# Patient Record
Sex: FEMALE | Race: WHITE | NOT HISPANIC OR LATINO | Employment: FULL TIME | URBAN - METROPOLITAN AREA
[De-identification: names, ages, dates, MRNs, and addresses within clinical notes are randomized per-mention and may not be internally consistent; named-entity substitution may affect disease eponyms.]

---

## 2017-05-11 ENCOUNTER — HISTORICAL (OUTPATIENT)
Dept: ADMINISTRATIVE | Facility: HOSPITAL | Age: 37
End: 2017-05-11

## 2017-05-11 LAB
ABS NEUT (OLG): 3.7 X10(3)/MCL (ref 2.1–9.2)
ALBUMIN SERPL-MCNC: 4 GM/DL (ref 3.4–5)
ALBUMIN/GLOB SERPL: 1.2 RATIO (ref 1.1–2)
ALP SERPL-CCNC: 57 UNIT/L (ref 38–126)
ALT SERPL-CCNC: 12 UNIT/L (ref 12–78)
AMYLASE SERPL-CCNC: 45 UNIT/L (ref 25–115)
AST SERPL-CCNC: 12 UNIT/L (ref 15–37)
BASOPHILS # BLD AUTO: 0 X10(3)/MCL (ref 0–0.2)
BASOPHILS NFR BLD AUTO: 0 %
BILIRUB SERPL-MCNC: 0.4 MG/DL (ref 0.2–1)
BILIRUBIN DIRECT+TOT PNL SERPL-MCNC: 0.1 MG/DL (ref 0–0.5)
BILIRUBIN DIRECT+TOT PNL SERPL-MCNC: 0.3 MG/DL (ref 0–0.8)
BUN SERPL-MCNC: 11 MG/DL (ref 7–18)
CALCIUM SERPL-MCNC: 8.9 MG/DL (ref 8.5–10.1)
CHLORIDE SERPL-SCNC: 106 MMOL/L (ref 98–107)
CO2 SERPL-SCNC: 27 MMOL/L (ref 21–32)
CREAT SERPL-MCNC: 0.7 MG/DL (ref 0.55–1.02)
EOSINOPHIL # BLD AUTO: 0 X10(3)/MCL (ref 0–0.9)
EOSINOPHIL NFR BLD AUTO: 1 %
ERYTHROCYTE [DISTWIDTH] IN BLOOD BY AUTOMATED COUNT: 11.9 % (ref 11.5–17)
GLOBULIN SER-MCNC: 3.2 GM/DL (ref 2.4–3.5)
GLUCOSE SERPL-MCNC: 87 MG/DL (ref 74–106)
HCT VFR BLD AUTO: 41.5 % (ref 37–47)
HGB BLD-MCNC: 13.6 GM/DL (ref 12–16)
LIPASE SERPL-CCNC: 168 UNIT/L (ref 73–393)
LYMPHOCYTES # BLD AUTO: 1.2 X10(3)/MCL (ref 0.6–4.6)
LYMPHOCYTES NFR BLD AUTO: 23 %
MCH RBC QN AUTO: 30.2 PG (ref 27–31)
MCHC RBC AUTO-ENTMCNC: 32.8 GM/DL (ref 33–36)
MCV RBC AUTO: 92 FL (ref 80–94)
MONOCYTES # BLD AUTO: 0.4 X10(3)/MCL (ref 0.1–1.3)
MONOCYTES NFR BLD AUTO: 6 %
NEUTROPHILS # BLD AUTO: 3.7 X10(3)/MCL (ref 1.4–7.9)
NEUTROPHILS NFR BLD AUTO: 69 %
PLATELET # BLD AUTO: 273 X10(3)/MCL (ref 130–400)
PMV BLD AUTO: 10.7 FL (ref 9.4–12.4)
POTASSIUM SERPL-SCNC: 4.4 MMOL/L (ref 3.5–5.1)
PROT SERPL-MCNC: 7.2 GM/DL (ref 6.4–8.2)
RBC # BLD AUTO: 4.51 X10(6)/MCL (ref 4.2–5.4)
SODIUM SERPL-SCNC: 143 MMOL/L (ref 136–145)
WBC # SPEC AUTO: 5.4 X10(3)/MCL (ref 4.5–11.5)

## 2017-05-13 LAB — FINAL CULTURE: NORMAL

## 2018-09-13 ENCOUNTER — HISTORICAL (OUTPATIENT)
Dept: ADMINISTRATIVE | Facility: HOSPITAL | Age: 38
End: 2018-09-13

## 2018-09-13 LAB
ABS NEUT (OLG): 4.07 X10(3)/MCL (ref 2.1–9.2)
ALBUMIN SERPL-MCNC: 3.7 GM/DL (ref 3.4–5)
ALBUMIN/GLOB SERPL: 1.1 {RATIO}
ALP SERPL-CCNC: 69 UNIT/L (ref 38–126)
ALT SERPL-CCNC: 17 UNIT/L (ref 12–78)
AST SERPL-CCNC: 15 UNIT/L (ref 15–37)
BASOPHILS # BLD AUTO: 0 X10(3)/MCL (ref 0–0.2)
BASOPHILS NFR BLD AUTO: 0 %
BILIRUB SERPL-MCNC: 0.4 MG/DL (ref 0.2–1)
BILIRUBIN DIRECT+TOT PNL SERPL-MCNC: 0.1 MG/DL (ref 0–0.2)
BILIRUBIN DIRECT+TOT PNL SERPL-MCNC: 0.3 MG/DL (ref 0–0.8)
BUN SERPL-MCNC: 15 MG/DL (ref 7–18)
CALCIUM SERPL-MCNC: 8.7 MG/DL (ref 8.5–10.1)
CHLORIDE SERPL-SCNC: 109 MMOL/L (ref 98–107)
CHOLEST SERPL-MCNC: 182 MG/DL (ref 0–200)
CHOLEST/HDLC SERPL: 3.3 {RATIO} (ref 0–4)
CO2 SERPL-SCNC: 28 MMOL/L (ref 21–32)
CREAT SERPL-MCNC: 0.6 MG/DL (ref 0.55–1.02)
DEPRECATED CALCIDIOL+CALCIFEROL SERPL-MC: 23.58 NG/ML (ref 30–80)
EOSINOPHIL # BLD AUTO: 0.2 X10(3)/MCL (ref 0–0.9)
EOSINOPHIL NFR BLD AUTO: 2 %
ERYTHROCYTE [DISTWIDTH] IN BLOOD BY AUTOMATED COUNT: 11.8 % (ref 11.5–17)
GLOBULIN SER-MCNC: 3.4 GM/DL (ref 2.4–3.5)
GLUCOSE SERPL-MCNC: 89 MG/DL (ref 74–106)
HCT VFR BLD AUTO: 40.4 % (ref 37–47)
HDLC SERPL-MCNC: 55 MG/DL (ref 35–60)
HGB BLD-MCNC: 13.3 GM/DL (ref 12–16)
LDLC SERPL CALC-MCNC: 117 MG/DL (ref 0–129)
LYMPHOCYTES # BLD AUTO: 1.8 X10(3)/MCL (ref 0.6–4.6)
LYMPHOCYTES NFR BLD AUTO: 26 %
MCH RBC QN AUTO: 30.9 PG (ref 27–31)
MCHC RBC AUTO-ENTMCNC: 32.9 GM/DL (ref 33–36)
MCV RBC AUTO: 94 FL (ref 80–94)
MONOCYTES # BLD AUTO: 0.7 X10(3)/MCL (ref 0.1–1.3)
MONOCYTES NFR BLD AUTO: 10 %
NEUTROPHILS # BLD AUTO: 4.07 X10(3)/MCL (ref 1.4–7.9)
NEUTROPHILS NFR BLD AUTO: 60 %
PLATELET # BLD AUTO: 278 X10(3)/MCL (ref 130–400)
PMV BLD AUTO: 9.5 FL (ref 9.4–12.4)
POTASSIUM SERPL-SCNC: 4.4 MMOL/L (ref 3.5–5.1)
PROT SERPL-MCNC: 7.1 GM/DL (ref 6.4–8.2)
RBC # BLD AUTO: 4.3 X10(6)/MCL (ref 4.2–5.4)
SODIUM SERPL-SCNC: 143 MMOL/L (ref 136–145)
TRIGL SERPL-MCNC: 48 MG/DL (ref 30–150)
TSH SERPL-ACNC: 0.53 MIU/L (ref 0.36–3.74)
VIT B12 SERPL-MCNC: 326 PG/ML (ref 193–986)
VLDLC SERPL CALC-MCNC: 10 MG/DL
WBC # SPEC AUTO: 6.8 X10(3)/MCL (ref 4.5–11.5)

## 2018-09-24 ENCOUNTER — HISTORICAL (OUTPATIENT)
Dept: ADMINISTRATIVE | Facility: HOSPITAL | Age: 38
End: 2018-09-24

## 2018-09-24 LAB
ALBUMIN SERPL-MCNC: 3.5 GM/DL (ref 3.4–5)
ALBUMIN/GLOB SERPL: 1.1 RATIO (ref 1.1–2)
ALP SERPL-CCNC: 67 UNIT/L (ref 38–126)
ALT SERPL-CCNC: 18 UNIT/L (ref 12–78)
AST SERPL-CCNC: 18 UNIT/L (ref 15–37)
BILIRUB SERPL-MCNC: 0.3 MG/DL (ref 0.2–1)
BILIRUBIN DIRECT+TOT PNL SERPL-MCNC: 0.1 MG/DL (ref 0–0.5)
BILIRUBIN DIRECT+TOT PNL SERPL-MCNC: 0.2 MG/DL (ref 0–0.8)
BUN SERPL-MCNC: 13 MG/DL (ref 7–18)
CALCIUM SERPL-MCNC: 8.6 MG/DL (ref 8.5–10.1)
CHLORIDE SERPL-SCNC: 108 MMOL/L (ref 98–107)
CO2 SERPL-SCNC: 29 MMOL/L (ref 21–32)
CORTIS SERPL-SCNC: 13 MCG/DL
CREAT SERPL-MCNC: 0.63 MG/DL (ref 0.55–1.02)
GLOBULIN SER-MCNC: 3.3 GM/DL (ref 2.4–3.5)
GLUCOSE SERPL-MCNC: 86 MG/DL (ref 74–106)
POTASSIUM SERPL-SCNC: 4.5 MMOL/L (ref 3.5–5.1)
PROT SERPL-MCNC: 6.8 GM/DL (ref 6.4–8.2)
SODIUM SERPL-SCNC: 141 MMOL/L (ref 136–145)
T4 FREE SERPL-MCNC: 0.9 NG/DL (ref 0.76–1.46)
TSH SERPL-ACNC: 0.73 MIU/L (ref 0.36–3.74)

## 2021-01-18 ENCOUNTER — HISTORICAL (OUTPATIENT)
Dept: ADMINISTRATIVE | Facility: HOSPITAL | Age: 41
End: 2021-01-18

## 2021-01-18 LAB
ABS NEUT (OLG): 4.1 X10(3)/MCL (ref 2.1–9.2)
ALBUMIN SERPL-MCNC: 4.3 GM/DL (ref 3.4–5)
ALBUMIN/GLOB SERPL: 1.87 {RATIO} (ref 1.5–2.5)
ALP SERPL-CCNC: 78 UNIT/L (ref 38–126)
ALT SERPL-CCNC: 7 UNIT/L (ref 7–52)
APPEARANCE, UA: ABNORMAL
AST SERPL-CCNC: 14 UNIT/L (ref 15–37)
BACTERIA #/AREA URNS AUTO: ABNORMAL /HPF
BILIRUB SERPL-MCNC: 0.8 MG/DL (ref 0.2–1)
BILIRUB UR QL STRIP: ABNORMAL MG/DL
BILIRUBIN DIRECT+TOT PNL SERPL-MCNC: 0.2 MG/DL (ref 0–0.5)
BILIRUBIN DIRECT+TOT PNL SERPL-MCNC: 0.6 MG/DL
BUN SERPL-MCNC: 12 MG/DL (ref 7–18)
CALCIUM SERPL-MCNC: 9.1 MG/DL (ref 8.5–10)
CHLORIDE SERPL-SCNC: 105 MMOL/L (ref 98–107)
CHOLEST SERPL-MCNC: 180 MG/DL (ref 0–200)
CHOLEST/HDLC SERPL: 3.7 {RATIO}
CO2 SERPL-SCNC: 27 MMOL/L (ref 21–32)
COLOR UR: YELLOW
CREAT SERPL-MCNC: 0.58 MG/DL (ref 0.6–1.3)
ERYTHROCYTE [DISTWIDTH] IN BLOOD BY AUTOMATED COUNT: 11.5 % (ref 11.5–17)
GLOBULIN SER-MCNC: 2.3 GM/DL (ref 1.2–3)
GLUCOSE (UA): NEGATIVE MG/DL
GLUCOSE SERPL-MCNC: 86 MG/DL (ref 74–106)
HCT VFR BLD AUTO: 40 % (ref 37–47)
HDLC SERPL-MCNC: 49 MG/DL (ref 35–60)
HGB BLD-MCNC: 13.3 GM/DL (ref 12–16)
HGB UR QL STRIP: ABNORMAL UNIT/L
KETONES UR QL STRIP: ABNORMAL MG/DL
LDLC SERPL CALC-MCNC: 116 MG/DL (ref 0–129)
LEUKOCYTE ESTERASE UR QL STRIP: NEGATIVE UNIT/L
LYMPHOCYTES # BLD AUTO: 1.6 X10(3)/MCL (ref 0.6–3.4)
LYMPHOCYTES NFR BLD AUTO: 25 % (ref 13–40)
MCH RBC QN AUTO: 29.5 PG (ref 27–31.2)
MCHC RBC AUTO-ENTMCNC: 33 GM/DL (ref 32–36)
MCV RBC AUTO: 89 FL (ref 80–94)
MONOCYTES # BLD AUTO: 0.5 X10(3)/MCL (ref 0.1–1.3)
MONOCYTES NFR BLD AUTO: 7.8 % (ref 0.1–24)
NEUTROPHILS NFR BLD AUTO: 67.2 % (ref 47–80)
NITRITE UR QL STRIP.AUTO: NEGATIVE
PH UR STRIP: 5.5 [PH]
PLATELET # BLD AUTO: 304 X10(3)/MCL (ref 130–400)
PMV BLD AUTO: 9.4 FL (ref 9.4–12.4)
POTASSIUM SERPL-SCNC: 4 MMOL/L (ref 3.5–5.1)
PROT SERPL-MCNC: 6.6 GM/DL (ref 6.4–8.2)
PROT UR QL STRIP: NEGATIVE MG/DL
RBC # BLD AUTO: 4.51 X10(6)/MCL (ref 4.2–5.4)
RBC #/AREA URNS HPF: ABNORMAL /HPF
SODIUM SERPL-SCNC: 141 MMOL/L (ref 136–145)
SP GR UR STRIP: >1.03
SQUAMOUS EPITHELIAL, UA: ABNORMAL /LPF
TRIGL SERPL-MCNC: 104 MG/DL (ref 30–150)
TSH SERPL-ACNC: 0.82 MIU/ML (ref 0.35–4.94)
UROBILINOGEN UR STRIP-ACNC: 0.2 MG/DL
VLDLC SERPL CALC-MCNC: 20.8 MG/DL
WBC # SPEC AUTO: 6.2 X10(3)/MCL (ref 4.5–11.5)
WBC #/AREA URNS AUTO: ABNORMAL /[HPF]

## 2021-09-22 LAB
HUMAN PAPILLOMAVIRUS (HPV): NORMAL
PAP RECOMMENDATION EXT: NORMAL

## 2022-01-20 ENCOUNTER — HISTORICAL (OUTPATIENT)
Dept: ADMINISTRATIVE | Facility: HOSPITAL | Age: 42
End: 2022-01-20

## 2022-01-20 LAB
ABS NEUT (OLG): 3 X10(3)/MCL (ref 2.1–9.2)
ALBUMIN SERPL-MCNC: 4.6 GM/DL (ref 3.4–5)
ALBUMIN/GLOB SERPL: 1.92 {RATIO} (ref 1.5–2.5)
ALP SERPL-CCNC: 56 UNIT/L (ref 38–126)
ALT SERPL-CCNC: 10 UNIT/L (ref 7–52)
APPEARANCE, UA: CLEAR
AST SERPL-CCNC: 16 UNIT/L (ref 15–37)
BACTERIA #/AREA URNS AUTO: ABNORMAL /HPF
BILIRUB SERPL-MCNC: 0.5 MG/DL (ref 0.2–1)
BILIRUB UR QL STRIP: ABNORMAL MG/DL
BILIRUBIN DIRECT+TOT PNL SERPL-MCNC: 0.1 MG/DL (ref 0–0.5)
BILIRUBIN DIRECT+TOT PNL SERPL-MCNC: 0.4 MG/DL
BUN SERPL-MCNC: 18 MG/DL (ref 7–18)
CALCIUM SERPL-MCNC: 9.2 MG/DL (ref 8.5–10)
CHLORIDE SERPL-SCNC: 106 MMOL/L (ref 98–107)
CHOLEST SERPL-MCNC: 225 MG/DL (ref 0–200)
CHOLEST/HDLC SERPL: 5.5 {RATIO}
CO2 SERPL-SCNC: 28 MMOL/L (ref 21–32)
COLOR UR: YELLOW
CREAT SERPL-MCNC: 0.66 MG/DL (ref 0.6–1.3)
ERYTHROCYTE [DISTWIDTH] IN BLOOD BY AUTOMATED COUNT: 12.1 % (ref 11.5–17)
EST CREAT CLEARANCE SER (OHS): 113.4 ML/MIN
GLOBULIN SER-MCNC: 2.4 GM/DL (ref 1.2–3)
GLUCOSE (UA): NEGATIVE MG/DL
GLUCOSE SERPL-MCNC: 100 MG/DL (ref 74–106)
HCT VFR BLD AUTO: 39 % (ref 37–47)
HDLC SERPL-MCNC: 41 MG/DL (ref 35–60)
HGB BLD-MCNC: 12.8 GM/DL (ref 12–16)
HGB UR QL STRIP: ABNORMAL UNIT/L
KETONES UR QL STRIP: ABNORMAL MG/DL
LDLC SERPL CALC-MCNC: 158 MG/DL (ref 0–129)
LEUKOCYTE ESTERASE UR QL STRIP: NEGATIVE UNIT/L
LYMPHOCYTES # BLD AUTO: 1.9 X10(3)/MCL (ref 0.6–3.4)
LYMPHOCYTES NFR BLD AUTO: 34 % (ref 13–40)
MCH RBC QN AUTO: 29.9 PG (ref 27–31.2)
MCHC RBC AUTO-ENTMCNC: 33 GM/DL (ref 32–36)
MCV RBC AUTO: 91 FL (ref 80–94)
MONOCYTES # BLD AUTO: 0.7 X10(3)/MCL (ref 0.1–1.3)
MONOCYTES NFR BLD AUTO: 12.7 % (ref 0.1–24)
NEUTROPHILS NFR BLD AUTO: 53.3 % (ref 47–80)
NITRITE UR QL STRIP.AUTO: NEGATIVE
PH UR STRIP: 6 [PH]
PLATELET # BLD AUTO: 335 X10(3)/MCL (ref 130–400)
PMV BLD AUTO: 9.1 FL (ref 9.4–12.4)
POTASSIUM SERPL-SCNC: 4.3 MMOL/L (ref 3.5–5.1)
PROT SERPL-MCNC: 7 GM/DL (ref 6.4–8.2)
PROT UR QL STRIP: NEGATIVE MG/DL
RBC # BLD AUTO: 4.28 X10(6)/MCL (ref 4.2–5.4)
RBC #/AREA URNS HPF: ABNORMAL /HPF
SODIUM SERPL-SCNC: 142 MMOL/L (ref 136–145)
SP GR UR STRIP: >1.03
SQUAMOUS EPITHELIAL, UA: ABNORMAL /LPF
TRIGL SERPL-MCNC: 76 MG/DL (ref 30–150)
TSH SERPL-ACNC: 0.79 MIU/ML (ref 0.35–4.94)
UROBILINOGEN UR STRIP-ACNC: 0.2 MG/DL
VLDLC SERPL CALC-MCNC: 15.2 MG/DL
WBC # SPEC AUTO: 5.6 X10(3)/MCL (ref 4.5–11.5)
WBC #/AREA URNS AUTO: ABNORMAL /[HPF]

## 2022-04-10 ENCOUNTER — HISTORICAL (OUTPATIENT)
Dept: ADMINISTRATIVE | Facility: HOSPITAL | Age: 42
End: 2022-04-10

## 2022-04-27 VITALS
SYSTOLIC BLOOD PRESSURE: 114 MMHG | WEIGHT: 139.75 LBS | DIASTOLIC BLOOD PRESSURE: 78 MMHG | HEIGHT: 63 IN | OXYGEN SATURATION: 99 % | BODY MASS INDEX: 24.76 KG/M2

## 2022-05-03 NOTE — HISTORICAL [facility] CERNER
This is a historical note converted from [place]. Formatting and pictures may have been removed.  Please reference Marco Antonio for original formatting and attached multimedia. Chief Complaint  wellness  History of Present Illness  The patient is a?41 year old?female here today for a complete Wellness Physical exam. The patient has?no acute complaints today. The patient also carries a diagnosis of?ADHD/AR/Anxiety/Depression/Fibromyalgia, which is assessed today. Exercise is reported as moderate? at this time, currently doing yoga and and is following a keto based diet. ?Monitors diet on a daily basis. Previous documented weight at last office visit is 138 pounds  She has a history of chronic sinus problems in which she was seen by ENT and has been overall controlled with OTC use of Claritin D prn.?  She also has a history of anxiety/depression/adhd which is followed by her psych provider- currently tolerating meds well without any s/e or issues, no breakthrough symptoms, no s/h ideation.  She was also diagnosed with fibromyalgia and was followed by rheumatology in  however states has not had any issues in several years and was advised at her last visit she was in remission.  ?   Rheumatology-Adolfo Granados MD- prn  Gastroenterology-Dr. Aayush Lopez  GYN-Dr. Haim Leach  Psychiatric-Dr. Hearn  Review of Systems  Constitutional:?no weight gain,?weight loss,?no fatigue,?no fever,?no chills,?no weakness,?no trouble sleeping.  Eyes:?no vision loss/changes,?no glasses or contacts,?no pain,?no redness,?no blurry or double vision,?no flashing lights,?no specks,?no glaucoma,?no cataracts.  Last eye exam:?unknown  Head:?no headache,?no head injury,?no neck pain.?  Neck:??no lumps,?no swollen glands,?no stiffness.  Ears:?no decreased hearing,?no ringing,?no earache,?no drainage.?  Nose:?no stuffiness,?no discharge,?no itching,?no hay fever,?no nosebleeds,?no sinus pain.  Throat:?no bleeding,?no dentures,?no sore tongue,?no dry  mouth,?no sore throat,?no hoarseness,?no thrush,?no non-healing sores.  Cardiovascular:?no chest pain or discomfort,?no tightness,?no palpitations,?no SOB with activity,?no difficulty breathing while supine,?no swelling,?no sudden awakening from sleep with SOB.  Vascular:?no calf pain with walking,?no leg cramping.  Respiratory:??no cough,?no sputum,?no coughing up blood,?no SOB,?no wheezing,?no painful breathing.  Gastrointestinal:?no swallowing difficulty,?no heartburn,?no change in appetite,?no nausea,?no change in bowel habits,?no rectal bleeding,?no constipation,?no diarrhea,?no yellow eyes or skin.  Urinary:?no frequency,?no urgency,?no burning or pain,?no blood in urine,?no incontinence,?no change in urinary strength.  Musculoskeletal:?no muscle or joint pain,?no stiffness,?no back pain,?no redness of joints,?no swelling of joints,?no trauma.  Skin:?no rashes,?no lumps,?no itching,?no dryness,?color normal for ethnicity,?no hair or nail changes.  Neurologic:?no dizziness,?no fainting,?no seizures,?no weakness,?no numbness,?no tingling,?no tremors.  Psychiatric:?no nervousness,?no stress,?no depression,?no memory loss.  Endocrine:?no heat or cold intolerance,?no sweating,?no frequent urination,?no thirst,?no change in appetite.  Hematologic:?no ease of bruising,?no ease of bleeding.  ?  Physical Exam  Vitals & Measurements  BP:?114/78?  HT:?160.00?cm? WT:?63.400?kg? BMI:?24.77?  General- In NAD, A&O x 4  ?   Eye- PERRL, EOMI  ?   HENT- TM/EAC clear, Nose mucosa WNL, No D/C, No Sinus Tenderness, O/P without erythema or exudates?  ?   Neck- S, No LA, No Thyromegaly, No bruits, No JVD  ?   Respiratory- CTA, No wheezing, No crackles, No rhonchi  ?   Cardiovascular- RRR W/O MGR, Pulses equal throughout  ?   Gastrointestinal- S, NT, No HSM, NABS, No masses, No peritoneal signs?  ?   Lymphatics- WNL  ?  Musculoskeletal- No tenderness, Joints WNL, FROM, Neg SLR, No CCE  ?  Integumentary- Warm, dry, intact, No  lesions/rashes/hives  ?  Neurologic- No Motor/Sensory deficits, Reflexes +2 throughout, CN II-XII intact, Neg cerebellar tests  ?  Assessment/Plan  1.?Wellness examination?Z00.00  1. Wellness  - Health and Exercise educated upon  -10% weight loss goal  -Lifestyle counseling > 20 minutes  -Diet:?Healthy choices  -Screening:?Up-to-date with Pap smears with GYN, Dr. Leach, due for yearly eye exam  -Vaccines:?Tdap/Covid + booster UTD, flu today  -LabS:?CBC, CMP, TSH, lipids, UA  ?   2. Comorbidities- mentioned  ?   3. Referrals?none at this time  ?   4. RTC in?12 months, sooner if needed  Ordered:  CBC w/ Auto Diff, Routine collect, 01/20/22 10:38:00 CST, Blood, Order for future visit, Stop date 01/20/22 10:38:00 CST, Lab Collect, Wellness examination, 01/20/22 10:38:00 CST  Clinic Follow up, *Est. 01/20/23 3:00:00 CST, Order for future visit, Wellness examination, [facility]  Clinic Follow-up PRN, 01/20/22 10:38:00 CST, [facility], Future Order  Comprehensive Metabolic Panel, Routine collect, 01/20/22 10:38:00 CST, Blood, Order for future visit, Stop date 01/20/22 10:38:00 CST, Lab Collect, Wellness examination, 01/20/22 10:38:00 CST  Lab Collection Request, 01/20/22 10:38:00 CST, [facility], 01/20/22 10:38:00 CST, Wellness examination  Lipid Panel, Routine collect, 01/20/22 10:38:00 CST, Blood, Order for future visit, Stop date 01/20/22 10:38:00 CST, Lab Collect, Wellness examination, 01/20/22 10:38:00 CST  Preventative Health Care Est 40-64 years [id number] PC, Wellness examination  ADHD (Attention Deficit Hyperactivity Disorder)(  Confirmed  )  Anxiety  Depression  Fibromyalgia  AR (allergic rhinitis), [facility], 01/20/22 10:38:00 CST  Thyroid Stimulating Hormone, Routine collect, 01/20/22 10:38:00 CST, Blood, Order for future visit, Stop date 01/20/22 10:38:00 CST, Lab Collect, Wellness examination, 01/20/22 10:38:00 CST  Urinalysis no Reflex, Routine collect, Urine, Order for future visit,  01/20/22 10:38:00 CST, Stop date 01/20/22 10:38:00 CST, Nurse collect, Wellness examination  ?  2.?ADHD (Attention Deficit Hyperactivity Disorder)(  Confirmed  )?F90.0  1?. ?She is followed?closely by Dr. Hearn, psych  Ordered:  Preventative Health Care Est 40-64 years [id number] PC, Wellness examination  ADHD (Attention Deficit Hyperactivity Disorder)(  Confirmed  )  Anxiety  Depression  Fibromyalgia  AR (allergic rhinitis), [facility], 01/20/22 10:38:00 CST  ?  3.?Anxiety?F41.9  1?. ?She is followed?closely by Dr. Hearn, psych?- currently controlled with meds, no breakthrough issues  Ordered:  Preventative Health Care Est 40-64 years [id number] PC, Wellness examination  ADHD (Attention Deficit Hyperactivity Disorder)(  Confirmed  )  Anxiety  Depression  Fibromyalgia  AR (allergic rhinitis), [facility], 01/20/22 10:38:00 CST  ?  4.?Depression?F32.9  1?. ?She is followed?closely by Dr. Hearn, psych- currently controlled with meds, no breakthrough issues  Ordered:  Preventative Health Care Est 40-64 years [address]30 PC, Wellness examination  ADHD (Attention Deficit Hyperactivity Disorder)(  Confirmed  )  Anxiety  Depression  Fibromyalgia  AR (allergic rhinitis), [facility], 01/20/22 10:38:00 CST  ?  5.?Fibromyalgia?M79.7  1?. ?Followed by?MD in [facility]  Ordered:  Preventative Health Care Est 40-64 years 23045 PC, Wellness examination  ADHD (Attention Deficit Hyperactivity Disorder)(  Confirmed  )  Anxiety  Depression  Fibromyalgia  AR (allergic rhinitis), [facility], 01/20/22 10:38:00 CST  ?  6.?AR (allergic rhinitis)?J30.9  1.??Currently controlled with OTC meds  Ordered:  Preventative Health Care Est 40-64 years 35496 PC, Wellness examination  ADHD (Attention Deficit Hyperactivity Disorder)(  Confirmed  )  Anxiety  Depression  Fibromyalgia  AR (allergic rhinitis), [facility], 01/20/22 10:38:00 CST  ?  7.?Need for vaccination?Z23  1. Flu vaccine  today  Ordered:  Influenza Virus Vaccine, Inactivated, 0.5 mL, form: Susp, IM, Once, first dose 01/20/22 10:35:00 CST, stop date 01/20/22 10:35:00 CST  ?  Referrals  Clinic Follow up, *Est. 01/20/23 3:00:00 CST, Order for future visit, Wellness examination, [facility]  Clinic Follow-up PRN, 01/20/22 10:38:00 CST, [facility], Future Order   Problem List/Past Medical History  Ongoing  ADHD (Attention Deficit Hyperactivity Disorder)(  Confirmed  )  Anxiety  Chronic back pain  Chronic joint pain(  Confirmed  )  Constipation  Depression  Fatigue  Fibromyalgia  Hypocalcemia  Interstitial cystitis  Interstitial cystitis  Migraines  Pancreatitis  Panic attacks  Rheumatoid arthritis without rheumatoid factor of wrist(  Confirmed  )  Historical  ADD/ADHD  can walk 2 blocks briskly without shortness of breath or chest pain  chronic sinus infections  ear/throat infections  Flu vaccine need  intermittent anemia  steroid  upper respiratory infection within last 6 weeks  Urinary tract infection  Procedure/Surgical History  Endoscopic Ultrasonography (Upper) (03/29/2016)  Esophagogastroduodenoscopy (03/29/2016)  Esophagogastroduodenoscopy, flexible, transoral; with endoscopic ultrasound examination, including the esophagus, stomach, and either the duodenum or a surgically altered stomach where the jejunum is examined distal to the anastomosis (03/29/2016)  Inspection of Upper Intestinal Tract, Via Natural or Artificial Opening Endoscopic (03/29/2016)  Ultrasonography of Gastrointestinal Tract (03/29/2016)  Ablation, soft tissue of inferior turbinates, unilateral or bilateral, any method (eg, electrocautery, radiofrequency ablation, or tissue volume reduction); intramural (ie, submucosal). (12/18/2013)  Ethmoidectomy (12/18/2013)  FESS/Navigation (None) (12/18/2013)  Frontal sinusotomy (12/18/2013)  Intranasal antrotomy (12/18/2013)  Nasal/sinus endoscopy, surgical with frontal sinus exploration, with or without removal of  tissue from frontal sinus. (2013)  Nasal/sinus endoscopy, surgical, with maxillary antrostomy;. (2013)  Nasal/sinus endoscopy, surgical, with sphenoidotomy;. (2013)  Nasal/sinus endoscopy, surgical; with zuly bullosa resection. (2013)  Nasal/sinus endoscopy, surgical; with ethmoidectomy, total (anterior and posterior). (2013)  Other computer assisted surgery (2013)  Other septoplasty (2013)  Other turbinectomy (2013)  Septoplasty or submucous resection, with or without cartilage scoring, contouring or replacement with graft. (2013)  Sphenoidotomy (2013)  Stereotactic computer-assisted (navigational) procedure; cranial, extradural (List separately in addition to code for primary procedure). (2013)  Turbinectomy by diathermy or cryosurgery (2013)   section  D&C - Dilatation and curettage  S/P ACL Repair  H/O:  section  right knee surgery   Medications  Adderall 15 mg oral tablet, 15 mg= 1 tab(s), Oral, BID  Clonazepam 1 Mg Tablet, 1 mg= 1 tab(s), Oral, BID  Influenza Virus Vaccine, Inactivated, 0.5 mL, IM, Once  Allergies  Latex?(Rash)  penicillin?(hives)  Social History  Abuse/Neglect  No, No, No, 2020  Alcohol - Denies Alcohol Use, 12/10/2013  Past, 2015  Employment/School  Employed, Highest education level: University degree(s)., 12/10/2013  Exercise - Does not exercise, 2015  Exercise frequency: 3-4 times/week., 2018  Home/Environment  Lives with Spouse, family., 12/10/2013  Nutrition/Health  Regular, 2015  Sexual  Sexually active: Yes., 2015  Substance Use  Never, 2018  Tobacco  Former smoker, quit more than 30 days ago, N/A, 2020  Previous treatment: None., 2018  Past, Cigarettes, 12/10/2013  Family History  Anxiety.: Father.  Depression.: Father.  Hypertension.: Mother.  Immunizations  Vaccine Date Status   COVID-19, vector nr, [id number] - [facility] 2021 Recorded    tetanus/diphtheria/pertussis, acel(Tdap) 01/18/2021 Given   influenza virus vaccine, inactivated 12/07/2020 Given   influenza virus vaccine, inactivated 10/10/2016 Recorded   influenza virus vaccine, inactivated 11/04/2015 Given   Health Maintenance  Health Maintenance  ???Pending?(in the next year)  ??? ??OverDue  ??? ? ? ?Influenza Vaccine due??10/01/21??and every 1??day(s)  ??? ??Due?  ??? ? ? ?ADL Screening due??01/20/22??and every 1??year(s)  ??? ??Due In Future?  ??? ? ? ?Obesity Screening not due until??01/01/23??and every 1??year(s)  ??? ? ? ?Alcohol Misuse Screening not due until??01/02/23??and every 1??year(s)  ???Satisfied?(in the past 1 year)  ??? ??Satisfied?  ??? ? ? ?Alcohol Misuse Screening on??01/20/22.??Satisfied by Michelle Osorio NP  ??? ? ? ?Blood Pressure Screening on??01/20/22.??Satisfied by Esme Crow MA  ??? ? ? ?Body Mass Index Check on??01/20/22.??Satisfied by Esme Crow MA  ??? ? ? ?Cervical Cancer Screening on??09/22/21.??Satisfied by Sarahy NOVOA(ASCP)Ynes  ??? ? ? ?Influenza Vaccine on??01/20/22.??Satisfied by Michelle Osorio NP  ??? ? ? ?Obesity Screening on??01/20/22.??Satisfied by Esme Crow MA  ?

## 2022-05-03 NOTE — HISTORICAL [facility] CERNER
This is a historical note converted from Marco Antonio. Formatting and pictures may have been removed.  Please reference Marco Antonio for original formatting and attached multimedia. Chief Complaint  wellness  History of Present Illness  The patient is a?40 year old?female here today for a complete Wellness Physical exam. The patient has?no acute complaints today. The patient also carries a diagnosis of?and, which is assessed today. Exercise is reported as well at this time?however she has started yoga and plans to increase. ?Monitors diet on a daily basis. Previous documented weight at last office visit is 146 pounds, positive loss noted, congratulated her on this  She has a history of chronic sinus problems in which she was seen by Dr. Garcia for a fist in the past.? She states that she has suffered?with a postnasal drip?for the past?year in which she has tried multiple over-the-counter medications?with minimal relief.? She is currently?not on any?nasal sprays at this time?as she is not having any sinus issues?but it would like to consider a different medication?to help with her?postnasal drip  ?  Rheumatology-Adolfo Granados MD  Gastroenterology-Dr. Aayush Lopez  GYN-Dr. Haim Leach  Psychiatric-Dr. Hearn  Review of Systems  Constitutional:?no weight gain,?weight loss,?no fatigue,?no fever,?no chills,?no weakness,?no trouble sleeping.  Eyes:?no vision loss/changes,?glasses or contacts,?no pain,?no redness,?no blurry or double vision,?no flashing lights,?no specks,?no glaucoma,?no cataracts.  Last eye exam:?within last year  Head:?no headache,?no head injury,?no neck pain.?  Neck:??no lumps,?no swollen glands,?no stiffness.  Ears:?no decreased hearing,?no ringing,?no earache,?no drainage.?  Nose:?no stuffiness,?no discharge,?no itching,?no hay fever,?no nosebleeds,?no sinus pain.  Throat:?no bleeding,?no dentures,?no sore tongue,?no dry mouth,?no sore throat,?no hoarseness,?no thrush,?no non-healing  sores.  Cardiovascular:?no chest pain or discomfort,?no tightness,?no palpitations,?no SOB with activity,?no difficulty breathing while supine,?no swelling,?no sudden awakening from sleep with SOB.  Vascular:?no calf pain with walking,?no leg cramping.  Respiratory:??no cough,?no sputum,?no coughing up blood,?no SOB,?no wheezing,?no painful breathing.  Gastrointestinal:?no swallowing difficulty,?no heartburn,?no change in appetite,?no nausea,?no change in bowel habits,?no rectal bleeding,?no constipation,?no diarrhea,?no yellow eyes or skin.  Urinary:?no frequency,?no urgency,?no burning or pain,?no blood in urine,?no incontinence,?no change in urinary strength.  Musculoskeletal:?no muscle or joint pain,?no stiffness,?no back pain,?no redness of joints,?no swelling of joints,?no trauma.  Skin:?no rashes,?no lumps,?no itching,?no dryness,?color normal for ethnicity,?no hair or nail changes.  Neurologic:?no dizziness,?no fainting,?no seizures,?no weakness,?no numbness,?no tingling,?no tremors.  Psychiatric:?no nervousness,?stress,?no depression,?no memory loss.- currently stable, no s/h ideation  Endocrine:?no heat or cold intolerance,?no sweating,?no frequent urination,?no thirst,?no change in appetite.  Hematologic:?no ease of bruising,?no ease of bleeding.  ?  Physical Exam  Vitals & Measurements  BP:?122/78?  HT:?160.00?cm? WT:?62.900?kg? BMI:?24.57?  General- In NAD, A&O x 4  ?   Eye- PERRL, EOMI  ?   HENT- TM/EAC clear, Nose mucosa WNL, No D/C, No Sinus Tenderness, O/P without erythema or exudates?  ?   Neck- S, No LA, No Thyromegaly, No bruits, No JVD  ?   Respiratory- CTA, No wheezing, No crackles, No rhonchi  ?   Cardiovascular- RRR W/O MGR, Pulses equal throughout  ?   Gastrointestinal- S, NT, No HSM, NABS, No masses, No peritoneal signs?  ?   Lymphatics- WNL  ?  Musculoskeletal- No tenderness, Joints WNL, FROM, Neg SLR, No CCE  ?  Integumentary- Warm, dry, intact, No lesions/rashes/hives  ?  Neurologic- No  Motor/Sensory deficits, Reflexes +2 throughout, CN II-XII intact, Neg cerebellar tests  ?  Assessment/Plan  1.?Wellness examination?Z00.00  1. Wellness  - Health and Exercise educated upon  -10% weight loss goal  -Lifestyle counseling > 20 minutes  -Diet:?Healthy choices  -Screening:?Up-to-date with Pap smears with GYN, Dr. Leach, up-to-date with yearly eye exam  -Vaccines:?Tdap?today, flu up-to-date  -LabS:?CBC, CMP, TSH, lipids, UA  ?  2. Comorbidities- mentioned  ?  3. Referrals?none at this time  ?  4. RTC in?12 months, sooner if needed  Ordered:  Clinic Follow up, *Est. 01/20/22 10:15:00 CST, Order for future visit, Wellness examination, [facility]  Clinic Follow-up PRN, 01/18/21 10:47:00 CST, [facility] - [facility], Future Order  Comprehensive Metabolic Panel, Routine collect, 01/18/21 11:02:00 CST, Blood, Stop date 01/18/21 11:02:00 CST, Lab Collect, Wellness examination, 01/18/21 11:02:00 CST  Lipid Panel, Routine collect, 01/18/21 11:02:00 CST, Blood, Stop date 01/18/21 11:02:00 CST, Lab Collect, Wellness examination, 01/18/21 11:02:00 CST  Preventative Health Care Est 18-39 years 9[address] PC, Wellness examination, [facility] AMB - [facility], 01/18/21 10:47:00 CST  Thyroid Stimulating Hormone, Routine collect, 01/18/21 11:02:00 CST, Blood, Stop date 01/18/21 11:02:00 CST, Lab Collect, Wellness examination, 01/18/21 11:02:00 CST  Urinalysis no Reflex, Routine collect, Urine, 01/18/21 11:02:00 CST, Stop date 01/18/21 11:02:00 CST, Nurse collect, Wellness examination  ?  2.?ADHD (Attention Deficit Hyperactivity Disorder)(  Confirmed  )?F90.0  1?. ?She is followed?closely by Dr. Hearn, psych  ?  3.?Anxiety?F41.9  ?1. ?Followed by Dr. Hearn, psych  ?  4.?Constipation?K59.00  ?1.? Denies any issues at this time, she?follows up with her GI,?Dr. Lopez  ?  5.?Fibromyalgia?M79.7  1?. ?Followed by?MD in [place]  ?  6.?Need for vaccination?Z23  1?. ?Tdap today, side effects reviewed and discussed  ?  7.?AR (allergic  rhinitis)?J30.9  ?1. ?I will send over?a prescription of Xyzal 5 mg p.o. nightly to see if this helps, she will keep us posted. ?If symptoms continue she will consider follow-up with her ENT Dr. Garcia  ?  Orders:  levocetirizine, 5 mg = 1 tab(s), Oral, qPM, # 30 tab(s), 2 Refill(s), Pharmacy: [facility], 160, cm, Height/Length Dosing, 01/18/21 10:20:00 CST, 62.9, kg, Weight Dosing, 01/18/21 10:20:00 CST  Referrals  Clinic Follow up, *Est. 01/20/22 10:15:00 CST, Order for future visit, Wellness examination, [facility]  Clinic Follow-up PRN, 01/18/21 10:47:00 CST, [facility] AMB - [facility], Future Order   Problem List/Past Medical History  Ongoing  ADHD (Attention Deficit Hyperactivity Disorder)(  Confirmed  )  Anxiety  Chronic back pain  Chronic joint pain(  Confirmed  )  Constipation  Depression  Fatigue  Fibromyalgia  Hypocalcemia  Interstitial cystitis  Interstitial cystitis  Migraines  Pancreatitis  Panic attacks  Rheumatoid arthritis without rheumatoid factor of wrist(  Confirmed  )  Historical  ADD/ADHD  can walk 2 blocks briskly without shortness of breath or chest pain  chronic sinus infections  ear/throat infections  Flu vaccine need  intermittent anemia  steroid  upper respiratory infection within last 6 weeks  Urinary tract infection  Procedure/Surgical History  Endoscopic Ultrasonography (Upper) (03/29/2016)  Esophagogastroduodenoscopy (03/29/2016)  Esophagogastroduodenoscopy, flexible, transoral; with endoscopic ultrasound examination, including the esophagus, stomach, and either the duodenum or a surgically altered stomach where the jejunum is examined distal to the anastomosis (03/29/2016)  Inspection of Upper Intestinal Tract, Via Natural or Artificial Opening Endoscopic (03/29/2016)  Ultrasonography of Gastrointestinal Tract (03/29/2016)  Ablation, soft tissue of inferior turbinates, unilateral or bilateral, any method (eg, electrocautery, radiofrequency ablation, or tissue volume reduction);  intramural (ie, submucosal). (2013)  Ethmoidectomy (2013)  FESS/Navigation (None) (2013)  Frontal sinusotomy (2013)  Intranasal antrotomy (2013)  Nasal/sinus endoscopy, surgical with frontal sinus exploration, with or without removal of tissue from frontal sinus. (2013)  Nasal/sinus endoscopy, surgical, with maxillary antrostomy;. (2013)  Nasal/sinus endoscopy, surgical, with sphenoidotomy;. (2013)  Nasal/sinus endoscopy, surgical; with zuly bullosa resection. (2013)  Nasal/sinus endoscopy, surgical; with ethmoidectomy, total (anterior and posterior). (2013)  Other computer assisted surgery (2013)  Other septoplasty (2013)  Other turbinectomy (2013)  Septoplasty or submucous resection, with or without cartilage scoring, contouring or replacement with graft. (2013)  Sphenoidotomy (2013)  Stereotactic computer-assisted (navigational) procedure; cranial, extradural (List separately in addition to code for primary procedure). (2013)  Turbinectomy by diathermy or cryosurgery (2013)   section  D&C - Dilatation and curettage  S/P ACL Repair  H/O:  section  right knee surgery   Medications  Adderall 15 mg oral tablet, 15 mg= 1 tab(s), Oral, BID  Clonazepam 1 Mg Tablet, 1 mg= 1 tab(s), Oral, BID  Xyzal 5 mg oral tablet, 5 mg= 1 tab(s), Oral, qPM, 2 refills  Allergies  Latex?(Rash)  penicillin?(hives)  Social History  Abuse/Neglect  No, No, No, 2020  Alcohol - Denies Alcohol Use, 12/10/2013  Past, 2015  Employment/School  Employed, Highest education level: University degree(s)., 12/10/2013  Exercise - Does not exercise, 2015  Exercise frequency: 3-4 times/week., 2018  Home/Environment  Lives with Spouse, family., 12/10/2013  Nutrition/Health  Regular, 2015  Sexual  Sexually active: Yes., 2015  Substance Use  Never, 2018  Tobacco  Former smoker, quit more than 30 days  ago, N/A, 12/07/2020  Previous treatment: None., 09/11/2018  Past, Cigarettes, 12/10/2013  Family History  Anxiety.: Father.  Depression.: Father.  Hypertension.: Mother.  Immunizations  Vaccine Date Status   tetanus/diphtheria/pertussis, acel(Tdap) 01/18/2021 Given   influenza virus vaccine, inactivated 12/07/2020 Given   influenza virus vaccine, inactivated 10/10/2016 Recorded   influenza virus vaccine, inactivated 11/04/2015 Given   Health Maintenance  Health Maintenance  ???Pending?(in the next year)  ??? ??Due?  ??? ? ? ?ADL Screening due??01/18/21??and every 1??year(s)  ??? ??Due In Future?  ??? ? ? ?Influenza Vaccine not due until??10/01/21??and every 1??day(s)  ??? ? ? ?Obesity Screening not due until??01/01/22??and every 1??year(s)  ??? ? ? ?Alcohol Misuse Screening not due until??01/02/22??and every 1??year(s)  ???Satisfied?(in the past 1 year)  ??? ??Satisfied?  ??? ? ? ?Alcohol Misuse Screening on??01/18/21.??Satisfied by Michelle Osorio NP  ??? ? ? ?Blood Pressure Screening on??01/18/21.??Satisfied by Esme Crow MA  ??? ? ? ?Body Mass Index Check on??01/18/21.??Satisfied by Esme Crow MA  ??? ? ? ?Cervical Cancer Screening on??09/17/20.??Satisfied by Gege He  ??? ? ? ?Depression Screening on??01/18/21.??Satisfied by Michelle Osorio NP  ??? ? ? ?Influenza Vaccine on??12/07/20.??Satisfied by Esme Crow MA  ??? ? ? ?Obesity Screening on??01/18/21.??Satisfied by Esme Crow MA  ??? ? ? ?Tetanus Vaccine on??01/18/21.??Satisfied by Esme Crow MA  ?      The?physician?is present within?the office with the?nurse practitioner.??The?office visit?documentation and management have been?reviewed and agreed upon.? I will continue to follow?this patient along with?the nurse practitioner?for current and future care.

## 2022-11-15 ENCOUNTER — DOCUMENTATION ONLY (OUTPATIENT)
Dept: ADMINISTRATIVE | Facility: HOSPITAL | Age: 42
End: 2022-11-15

## 2024-04-25 ENCOUNTER — LAB REQUISITION (OUTPATIENT)
Dept: LAB | Facility: HOSPITAL | Age: 44
End: 2024-04-25

## 2024-04-25 DIAGNOSIS — R30.0 DYSURIA: ICD-10-CM

## 2024-04-25 DIAGNOSIS — Z11.3 ENCOUNTER FOR SCREENING FOR INFECTIONS WITH A PREDOMINANTLY SEXUAL MODE OF TRANSMISSION: ICD-10-CM

## 2024-04-25 LAB
C TRACH DNA SPEC QL NAA+PROBE: NOT DETECTED
N GONORRHOEA DNA SPEC QL NAA+PROBE: NOT DETECTED
SOURCE (OHS): NORMAL

## 2024-04-25 PROCEDURE — 87591 N.GONORRHOEAE DNA AMP PROB: CPT | Performed by: OBSTETRICS & GYNECOLOGY

## 2024-04-25 PROCEDURE — 87086 URINE CULTURE/COLONY COUNT: CPT | Performed by: OBSTETRICS & GYNECOLOGY

## 2024-04-25 PROCEDURE — 87491 CHLMYD TRACH DNA AMP PROBE: CPT | Performed by: OBSTETRICS & GYNECOLOGY

## 2024-04-28 LAB — BACTERIA UR CULT: ABNORMAL

## 2024-11-12 ENCOUNTER — LAB REQUISITION (OUTPATIENT)
Dept: LAB | Facility: HOSPITAL | Age: 44
End: 2024-11-12

## 2024-11-12 DIAGNOSIS — R30.0 DYSURIA: ICD-10-CM

## 2024-11-12 PROCEDURE — 87086 URINE CULTURE/COLONY COUNT: CPT | Performed by: OBSTETRICS & GYNECOLOGY

## 2024-11-16 LAB
BACTERIA UR CULT: ABNORMAL
BACTERIA UR CULT: ABNORMAL